# Patient Record
Sex: MALE | Race: BLACK OR AFRICAN AMERICAN | Employment: OTHER | URBAN - METROPOLITAN AREA
[De-identification: names, ages, dates, MRNs, and addresses within clinical notes are randomized per-mention and may not be internally consistent; named-entity substitution may affect disease eponyms.]

---

## 2024-02-15 ENCOUNTER — OFFICE VISIT (OUTPATIENT)
Dept: NEUROLOGY | Age: 84
End: 2024-02-15

## 2024-02-15 VITALS
HEART RATE: 98 BPM | DIASTOLIC BLOOD PRESSURE: 77 MMHG | HEIGHT: 71 IN | SYSTOLIC BLOOD PRESSURE: 147 MMHG | BODY MASS INDEX: 27.02 KG/M2 | WEIGHT: 193 LBS

## 2024-02-15 DIAGNOSIS — G44.89 OTHER HEADACHE SYNDROME: ICD-10-CM

## 2024-02-15 DIAGNOSIS — G25.0 BENIGN ESSENTIAL TREMOR: Primary | ICD-10-CM

## 2024-02-15 RX ORDER — ACETAMINOPHEN 160 MG
TABLET,DISINTEGRATING ORAL DAILY
COMMUNITY

## 2024-02-15 RX ORDER — PROPRANOLOL HYDROCHLORIDE 20 MG/1
20 TABLET ORAL 2 TIMES DAILY
Qty: 60 TABLET | Refills: 3 | Status: SHIPPED | OUTPATIENT
Start: 2024-02-15

## 2024-02-15 RX ORDER — SILDENAFIL 100 MG/1
100 TABLET, FILM COATED ORAL PRN
COMMUNITY

## 2024-02-15 RX ORDER — TAMSULOSIN HYDROCHLORIDE 0.4 MG/1
0.4 CAPSULE ORAL DAILY
COMMUNITY

## 2024-02-15 RX ORDER — CHLORTHALIDONE 25 MG/1
25 TABLET ORAL DAILY
COMMUNITY

## 2024-02-15 NOTE — PROGRESS NOTES
[facility] [address], [address]  [place] [address]  [phone]      Chief Complaint   Patient presents with    New Patient     Cervicalgia        HPI  Michael Vitale is a 83 y.o. male who presents on referral from CHANCE Michelle from [facility].  Upon initial presentation patient and his wife state they are unsure of why that neurology appointment.  They state they were not told that they have his appointment.  With further questioning patient endorses that he does have some shaking his hands gets bothersome whenever he is writing.  When asked about headaches and concentration issues, the patient denies concentration issues.  He states he does have some headaches, but they are less than 1 time a month and are alleviated whenever he gets more sleep.  Regarding shaking his hands patient reports has been going on since 1968 seems to be somewhat worse now.  He is unsure what alleviates the symptoms as he is not currently in the past.  He does state that the shaking continues to be worse whenever he is under stress.  Denies other symptoms at this time.       Past Medical History:   Diagnosis Date    Chronic kidney disease     Depression     GERD (gastroesophageal reflux disease)     Hearing loss     Hydrocele     Hyperglycemia     Hypertension     Osteoarthritis        No past surgical history on file.    History reviewed. No pertinent family history.    Social History     Socioeconomic History    Marital status:      Spouse name: None    Number of children: None    Years of education: None    Highest education level: None   Tobacco Use    Smoking status: Never    Smokeless tobacco: Never   Substance and Sexual Activity    Alcohol use: Never    Drug use: Never         Current Outpatient Medications:     chlorthalidone (HYGROTON) 25 MG tablet, Take 1 tablet by mouth daily, Disp: , Rfl:     Cholecalciferol (VITAMIN D3) 50 MCG (2000 UT) CAPS, Take by mouth daily, Disp: ,

## 2024-06-17 ENCOUNTER — TELEPHONE (OUTPATIENT)
Dept: NEUROLOGY | Age: 84
End: 2024-06-17